# Patient Record
Sex: FEMALE | Race: WHITE | NOT HISPANIC OR LATINO | Employment: OTHER | URBAN - NONMETROPOLITAN AREA
[De-identification: names, ages, dates, MRNs, and addresses within clinical notes are randomized per-mention and may not be internally consistent; named-entity substitution may affect disease eponyms.]

---

## 2020-09-22 NOTE — PROGRESS NOTES
Patient: Terri Ba    YOB: 1955    Date: 09/25/2020    Primary Care Provider: Mónica Weaver APRN    Chief Complaint   Patient presents with   • Breast Cancer     pt needs port a cath placement.       SUBJECTIVE:    History of present illness: Patient was diagnosed with right breast cancer in March 2020.  Ultimately underwent a right breast mastectomy in August 26th.  She has had no significant issues from the surgical procedure.  She has had no associated fever or chills.  She will require chemotherapy and has been referred for Port-A-Cath placement.  She has never had a Port-A-Cath in the past.  She has not started chemotherapy yet.    The following portions of the patient's history were reviewed and updated as appropriate: allergies, current medications, past family history, past medical history, past social history, past surgical history and problem list.    Review of Systems   Constitutional: Negative for chills, fever and unexpected weight change.   HENT: Negative for trouble swallowing and voice change.    Eyes: Negative for visual disturbance.   Respiratory: Negative for apnea, cough, chest tightness and wheezing.    Cardiovascular: Negative for chest pain, palpitations and leg swelling.   Gastrointestinal: Negative for abdominal distention, abdominal pain, anal bleeding, blood in stool, constipation, diarrhea, nausea, rectal pain and vomiting.   Endocrine: Negative for cold intolerance and heat intolerance.   Genitourinary: Negative for difficulty urinating, dysuria, flank pain and hematuria.   Musculoskeletal: Negative for back pain, gait problem and joint swelling.   Skin: Negative for color change, rash and wound.   Neurological: Negative for dizziness, syncope, speech difficulty, weakness, numbness and headaches.   Hematological: Negative for adenopathy. Does not bruise/bleed easily.   Psychiatric/Behavioral: Negative for confusion. The patient is not nervous/anxious.   "      History:  Past Medical History:   Diagnosis Date   • Breast cancer (CMS/HCC)    • Hypertension        Past Surgical History:   Procedure Laterality Date   • MASTECTOMY Right        Family History   Problem Relation Age of Onset   • Hypertension Mother    • Hypertension Sister    • Cancer Sister        Social History     Tobacco Use   • Smoking status: Never Smoker   • Smokeless tobacco: Never Used   Substance Use Topics   • Alcohol use: Never     Frequency: Never   • Drug use: Never       Medications:     Current Outpatient Medications:   •  ALPRAZolam (XANAX) 0.5 MG tablet, Take 0.5 mg by mouth 2 (Two) Times a Day As Needed for Anxiety., Disp: , Rfl:   •  lisinopril (PRINIVIL,ZESTRIL) 10 MG tablet, Take 10 mg by mouth Daily., Disp: , Rfl:      Allergies:  No Known Allergies    OBJECTIVE:    Vital Signs:   Vitals:    09/25/20 0930   BP: 144/86   Pulse: 104   Resp: 18   Temp: 98.2 °F (36.8 °C)   TempSrc: Temporal   SpO2: 98%   Weight: 59 kg (130 lb)   Height: 165.1 cm (65\")       Physical Exam:   General Appearance:    Alert, cooperative, in no acute distress   Head:    Normocephalic, without obvious abnormality, atraumatic   Eyes:            Normal.  No scleral icterus.  PERRLA    Neck:   No adenopathy, supple, trachea midline, no thyromegaly, no   carotid bruit, no JVD   Lungs:     Clear to auscultation,respirations regular, even and                  unlabored    Heart:    Regular rhythm and normal rate, normal S1 and S2, no            murmur   Abdomen:     Normal bowel sounds, no masses, no organomegaly, soft        non-tender, non-distended, no guarding,    Extremities:   Moves all extremities well, no edema, no cyanosis, no             redness   Skin:  Healing right mastectomy incision   Neurologic:   Normal without gross deficits.   Psychiatric: No evidence of depression or anxiety        Results Review:   None    Review of Systems was reviewed and confirmed as accurate as documented by the " MA.    ASSESSMENT/PLAN:    1. Malignant neoplasm of right female breast, unspecified estrogen receptor status, unspecified site of breast (CMS/HCC)        Patient with right breast cancer requiring chemotherapy.  She was referred for port placement.  I have offered this to her and I explained the procedure to her.  She understands procedure.  She also understands the risks which include but are not limited to  bleeding, infection as well as pneumothorax and she understands these and wishes to proceed.     Electronically signed by Alon Herrera MD  09/25/20  09:52 EDT

## 2020-09-25 ENCOUNTER — OFFICE VISIT (OUTPATIENT)
Dept: SURGERY | Facility: CLINIC | Age: 65
End: 2020-09-25

## 2020-09-25 VITALS
RESPIRATION RATE: 18 BRPM | WEIGHT: 130 LBS | TEMPERATURE: 98.2 F | SYSTOLIC BLOOD PRESSURE: 144 MMHG | HEART RATE: 104 BPM | OXYGEN SATURATION: 98 % | BODY MASS INDEX: 21.66 KG/M2 | DIASTOLIC BLOOD PRESSURE: 86 MMHG | HEIGHT: 65 IN

## 2020-09-25 DIAGNOSIS — C50.911 MALIGNANT NEOPLASM OF RIGHT FEMALE BREAST, UNSPECIFIED ESTROGEN RECEPTOR STATUS, UNSPECIFIED SITE OF BREAST (HCC): Primary | ICD-10-CM

## 2020-09-25 DIAGNOSIS — Z01.818 PREOP TESTING: Primary | ICD-10-CM

## 2020-09-25 PROCEDURE — 99203 OFFICE O/P NEW LOW 30 MIN: CPT | Performed by: SURGERY

## 2020-09-25 RX ORDER — LISINOPRIL 10 MG/1
10 TABLET ORAL DAILY
COMMUNITY

## 2020-09-25 RX ORDER — ALPRAZOLAM 0.5 MG/1
0.5 TABLET ORAL 2 TIMES DAILY PRN
COMMUNITY

## 2020-10-05 ENCOUNTER — TELEPHONE (OUTPATIENT)
Dept: SURGERY | Facility: CLINIC | Age: 65
End: 2020-10-05

## 2020-10-05 NOTE — TELEPHONE ENCOUNTER
Pt called to cancel her procedure she should have had done this morning.  She wants to r/s at [facility] to 10/19/20

## 2020-10-06 ENCOUNTER — TELEPHONE (OUTPATIENT)
Dept: SURGERY | Facility: CLINIC | Age: 65
End: 2020-10-06

## 2020-10-19 ENCOUNTER — OUTSIDE FACILITY SERVICE (OUTPATIENT)
Dept: SURGERY | Facility: CLINIC | Age: 65
End: 2020-10-19

## 2020-10-19 PROCEDURE — 77001 FLUOROGUIDE FOR VEIN DEVICE: CPT | Performed by: SURGERY

## 2020-10-19 PROCEDURE — 36561 INSERT TUNNELED CV CATH: CPT | Performed by: SURGERY

## 2022-01-20 ENCOUNTER — TELEPHONE (OUTPATIENT)
Dept: SURGERY | Facility: CLINIC | Age: 67
End: 2022-01-20

## 2022-01-20 NOTE — PROGRESS NOTES
Location: Left chest    Procedure: Excision Port-A-Cath      I offered her excision of the Port-A-Cath as she no longer requires the port. Procedure and the risks and benefits were explained including bleeding and infection. The patient understands these and wishes to proceed.     The patient was brought to the procedure room. Consent and time out were performed. The area was prepped and draped in the usual fashion. 1% lidocaine with epinephrine was infused locally.  A transverse incision was made through the previous incision.  Dissection was continued deeply.  Port was identified and dissected free and removed in its entirety.  Wound was closed with interrupted deep dermal Vicryl closure and skin glue for the skin.  Dressings were applied.  Patient tolerated the procedure well.  There were no complications.  There was no blood loss.  Wound care instructions were given.  Follow-up as needed.

## 2022-01-20 NOTE — TELEPHONE ENCOUNTER
Pt wants to know if she can take medications tomorrow before her in office procedure.  She takes Xanax and Lisinopril.  Please call her back.  Thanks.

## 2022-01-21 ENCOUNTER — PROCEDURE VISIT (OUTPATIENT)
Dept: SURGERY | Facility: CLINIC | Age: 67
End: 2022-01-21

## 2022-01-21 DIAGNOSIS — Z95.828 PORT-A-CATH IN PLACE: Primary | ICD-10-CM

## 2022-01-21 PROCEDURE — 36590 REMOVAL TUNNELED CV CATH: CPT | Performed by: SURGERY
